# Patient Record
(demographics unavailable — no encounter records)

---

## 2025-03-13 NOTE — HISTORY OF PRESENT ILLNESS
[No Personal or Family History of Easy Bruising, Bleeding, or Issues with General Anesthesia] : No Personal or Family History of easy bruising, bleeding, or issues with general anesthesia [de-identified] : Today I had the pleasure of seeing JAZZ LEAL. JAZZ is a 5 year girl who presents for: failed test at school  History was obtained from patient, father and chart.   Failed a test at school per dad, unsure if what test. Likely failed a hearing test  No snoring. No ear infections. No throat infections.  Occasional nasal congestion.  No concerns for hearing or speech. Passed [employer].

## 2025-03-13 NOTE — ASSESSMENT
[FreeTextEntry1] : JAZZ is a 5 year old girl presenting for failed hearing screen  - audiogram reviewed as above, within normal limits - follow up as needed

## 2025-03-13 NOTE — REASON FOR VISIT
[Initial Evaluation] : an initial evaluation for [Father] : father [FreeTextEntry2] : ENT eval, failed test at school (hearing?)

## 2025-03-13 NOTE — PHYSICAL EXAM
[Partial] : partial cerumen impaction [2+] : 2+ [Normal Gait and Station] : normal gait and station [Normal muscle strength, symmetry and tone of facial, head and neck musculature] : normal muscle strength, symmetry and tone of facial, head and neck musculature [Normal] : no cervical lymphadenopathy [Effusion] : no effusion [Exposed Vessel] : left anterior vessel not exposed [Increased Work of Breathing] : no increased work of breathing with use of accessory muscles and retractions

## 2025-03-13 NOTE — REVIEW OF SYSTEMS
[Negative] : Heme/Lymph [de-identified] : As per HPI [de-identified] : As per HPI [de-identified] : As per HPI

## 2025-03-13 NOTE — CONSULT LETTER
[Dear  ___] : Dear  [unfilled], [Courtesy Letter:] : I had the pleasure of seeing your patient, [unfilled], in my office today. [Please see my note below.] : Please see my note below. [Consult Closing:] : Thank you very much for allowing me to participate in the care of this patient.  If you have any questions, please do not hesitate to contact me. [Sincerely,] : Sincerely, [FreeTextEntry2] : Dr. Jeovany Bautista [address] [place] [address] [phone] [FreeTextEntry3] : Irene Estrada MD Pediatric Otolaryngology / Head and Neck Surgery    [facility] [address] [place] [address] Tel [phone] Fax [phone]    [address] [facility], [facility] [address] [place] [address]  Tel [phone] [phone]